# Patient Record
(demographics unavailable — no encounter records)

---

## 2025-01-11 NOTE — HISTORY OF PRESENT ILLNESS
[FreeTextEntry1] : John (she/her) is a 26yo male to female transgender patient with a history of gender dysphoria. She seeks consultation for facial feminization surgery. She reports that she has been socially and medically transitioning since 2/2023. She has not noticed any additional breast growth in the last several months. She denies any lumps, bumps, or other recent changes in her breasts. She notes dysphoria related to not enough breast growth after estrogen exposure. She has not had any breast imaging. She denies a family history of breast cancer. She denies a significant mental health history. She has no past surgical history. She is followed at [facility] for gender affirmation care. She denies nicotine, alcohol, and marijuana use. Patient denies a history of previous gender affirming surgeries and gender affirming procedures such as Botox, silicone, fillers, liposuction and fat grafting. Patient denies personal and family medical history of clots, strokes, bleeding disorders and anesthesia problems. She reports that the male appearance of her face and breasts exacerbates her gender dysphoria and cause misgendering.  The areas contributing the most to her dysphoria are currently her jaw.

## 2025-01-11 NOTE — HISTORY OF PRESENT ILLNESS
[FreeTextEntry1] : John (she/her) is a 24yo male to female transgender patient with a history of gender dysphoria. She seeks consultation for facial feminization surgery. She reports that she has been socially and medically transitioning since 2/2023. She has not noticed any additional breast growth in the last several months. She denies any lumps, bumps, or other recent changes in her breasts. She notes dysphoria related to not enough breast growth after estrogen exposure. She has not had any breast imaging. She denies a family history of breast cancer. She denies a significant mental health history. She has no past surgical history. She is followed at [facility] for gender affirmation care. She denies nicotine, alcohol, and marijuana use. Patient denies a history of previous gender affirming surgeries and gender affirming procedures such as Botox, silicone, fillers, liposuction and fat grafting. Patient denies personal and family medical history of clots, strokes, bleeding disorders and anesthesia problems. She reports that the male appearance of her face and breasts exacerbates her gender dysphoria and cause misgendering.  The areas contributing the most to her dysphoria are currently her jaw.

## 2025-01-11 NOTE — ASSESSMENT
[FreeTextEntry1] : PE:Const: Awake, alert, oriented   Eyes: EOMI. sclera without erythema or injection  Resp: Even , unlabored. no increased WOB. No cough appreciated  Neck: no JVD  Neuro: Nl Tone  Psych: Nl Affect  HEENT:  Prominent mandibular angle with active masseter   prominent tracheal bulge ("Jn's Apple") Breast: Breast exam was performed with a medical assistant chaperone present (AC). No dominant masses, skin changes, nipple retraction, or palpable axillary lymphadenopathy.   Plan for medically necessary facial feminization and breast augmentation to alleviate symptoms of gender dysphoria  plan:  3dct scan for virtual surgical planning   bilateral mandibular angle reduction cpt 93378-50  and bilateral masseteric muscle resection 21296-50: This patient has an angular boxy appearing jaw which is consistent with a masculine face including lateral projection of the mandibular angles. Mandibular angle and soft tissue masseter resection will create a more feminine, triangular, and softer appearing jaw it will also narrow the lower facial with and create a V shaped appearance to the jawline.  bilateral breast augmentation with implants cpt 78949-29: around 250 cc implants  [implant orders]  Refer to Dr. WHITE for tracheoplasty cpt 41504 (Tracheal shave): A prominent thyroid cartilage is a feature associated with masculinity. The large, laryngeal prominence is characteristic of a masculine neck and a significant source of dysphoria for patients. Reducing the cartilage around this area will create a more feminine appearing neck and profile. Residual prominence is seen from the profile view as well as the frontal view and is more apparent when the head is raised.    We will need two letters of support in accordance with [place] Medicaid guidelines. Patient will work on obtaining these from PCP/mental health provider/endocrinologist.  I, Dr. Coates, personally performed the evaluation and management (E/M) services for this new patient. That E/M includes conducting the clinically appropriate initial history &/or exam, assessing all conditions, and establishing the plan of care. Today, my ANASTASIA, Paul Rowell PA-C, was here to observe my evaluation and management service for this patient & follow plan of care established by me going forward.

## 2025-01-11 NOTE — ASSESSMENT
[FreeTextEntry1] : PE:Const: Awake, alert, oriented   Eyes: EOMI. sclera without erythema or injection  Resp: Even , unlabored. no increased WOB. No cough appreciated  Neck: no JVD  Neuro: Nl Tone  Psych: Nl Affect  HEENT:  Prominent mandibular angle with active masseter   prominent tracheal bulge ("Jn's Apple") Breast: Breast exam was performed with a medical assistant chaperone present (AC). No dominant masses, skin changes, nipple retraction, or palpable axillary lymphadenopathy.   Plan for medically necessary facial feminization and breast augmentation to alleviate symptoms of gender dysphoria  plan:  3dct scan for virtual surgical planning   bilateral mandibular angle reduction cpt 71019-88  and bilateral masseteric muscle resection 21296-50: This patient has an angular boxy appearing jaw which is consistent with a masculine face including lateral projection of the mandibular angles. Mandibular angle and soft tissue masseter resection will create a more feminine, triangular, and softer appearing jaw it will also narrow the lower facial with and create a V shaped appearance to the jawline.  bilateral breast augmentation with implants cpt 24116-03: around 250 cc implants  [implant orders]  Refer to Dr. WHITE for tracheoplasty cpt 21826 (Tracheal shave): A prominent thyroid cartilage is a feature associated with masculinity. The large, laryngeal prominence is characteristic of a masculine neck and a significant source of dysphoria for patients. Reducing the cartilage around this area will create a more feminine appearing neck and profile. Residual prominence is seen from the profile view as well as the frontal view and is more apparent when the head is raised.    We will need two letters of support in accordance with [place] Medicaid guidelines. Patient will work on obtaining these from PCP/mental health provider/endocrinologist.  I, Dr. Coates, personally performed the evaluation and management (E/M) services for this new patient. That E/M includes conducting the clinically appropriate initial history &/or exam, assessing all conditions, and establishing the plan of care. Today, my ANASTASIA, Paul Rowell PA-C, was here to observe my evaluation and management service for this patient & follow plan of care established by me going forward.